# Patient Record
(demographics unavailable — no encounter records)

---

## 2017-05-14 NOTE — EDM.PDOC
ED HPI GENERAL MEDICAL PROBLEM





- General


Chief Complaint: Skin Complaint


Stated Complaint: LT LEG EDEMA


Time Seen by Provider: 17 07:00


Source of Information: Reports: Patient, Family


History Limitations: Reports: No Limitations





- History of Present Illness


INITIAL COMMENTS - FREE TEXT/NARRATIVE: 





c/o eczema of legs with oozing on the L x 2w





saw PCP 2w ago, labs done, hgb 8.9 which was down from 10.8 3y ago. Last 

colonoscopy 10/16 here was neg per pt.





INR 3.0 2w ago.





2w ago told to use hand lotion on legs. No change in meds. 





Inc oozing of L ankle x 2d, sock and shoe are wet. Chronic pain in both feet 

and ankles. 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 Uses walker x 2y, 

lives alone, worked 31 yr on concrete floor in  at [employer].





here with dtr and son-in-law, pt and dtr from [place]





does not know why she is anemic





2w ago had BUN/creat 20/1.8 (c/w 20/0.8 3y ago), TSH 5.8 and free T4 1.2 2w ago

, A1C 6.6 2w ago





h/o DVT 10y ago, on warfarin ever since





- Related Data


 Allergies











Allergy/AdvReac Type Severity Reaction Status Date / Time


 


ciprofloxacin [From Cipro] Allergy  Rash Verified 16 08:09


 


ciprofloxacin HCl Allergy  Rash Verified 16 08:09





[From Cipro]     











Home Meds: 


 Home Meds





Hydrochlorothiazide 25 mg PO DAILY 10/30/13 [History]


Levothyroxine Sodium [Synthroid] 75 mcg PO DAILY 10/30/13 [History]


Lisinopril [Prinivil] 40 mg PO DAILY 10/30/13 [History]


Multivitamin with Minerals [Multiple Vitamin] 1 tab PO DAILY 10/30/13 [History]


Nebivolol [Bystolic] 10 mg PO BID 10/30/13 [History]


Pravastatin [Pravachol] 40 mg PO BEDTIME 10/30/13 [History]


Triamcinolone Acetonide [Kenalog 0.1% Crm] 15 gm .XX BID PRN 10/30/13 [History]


Vit A/Vit C/Vit E/Zinc/Copper [Preservision Areds Softgel] 1 each PO DAILY 10/30

/13 [History]


Warfarin [Coumadin] 5 mg PO TUTHSA 10/30/13 [History]


amLODIPine [Norvasc] 10 mg PO BID 10/30/13 [History]


metFORMIN HCl [Glucophage] 500 mg PO QPM 10/30/13 [History]


Fexofenadine [Allegra] 60 mg PO DAILY PRN 10/23/14 [History]


Warfarin [Coumadin] 2.5 mg PO SUMOWEFR 10/23/14 [History]


Gabapentin [Neurontin] 100 mg PO BID PRN 16 [History]


Ketotifen Fumarate [Zaditor] 1 drop OP ASDIRECTED PRN 16 [History]


Calcium Carbonate [Calcium] 600 mg PO BID 11/10/16 [History]











Past Medical History


HEENT History: Reports: Cataract, Impaired Vision


Cardiovascular History: Reports: Blood Clots/VTE/DVT, Heart Murmur, High 

Cholesterol, Hypertension


Respiratory History: Reports: None


Gastrointestinal History: Reports: Diverticulosis


Genitourinary History: Reports: Urinary Incontinence, Other (See Below)


Other Genitourinary History: A&P REPAIR,  DYSFUNCTIONAL UTERINE BLEEDING.


OB/GYN History: Reports: Dysfunctional Uterine Bleeding, Pregnancy


Other OB/BYN History:  II PARA II


Musculoskeletal History: Reports: Osteoarthritis


Neurological History: Reports: CVA


Psychiatric History: Reports: None


Endocrine/Metabolic History: Reports: Diabetes, Type II, Hypothyroidism, Obesity

/BMI 30+


Hematologic History: Reports: Anemia


Other Hematologic History: POST SURGICAL FROM [facility]


Oncologic (Cancer) History: Reports: None


Dermatologic History: Reports: Venous Stasis Dermatitis





- Infectious Disease History


Infectious Disease History: Reports: Chicken Pox, Measles, Mumps





- Past Surgical History


Head Surgeries/Procedures: Reports: None


HEENT Surgical History: Reports: Cataract Surgery


GI Surgical History: Reports: Appendectomy, Cholecystectomy, Colon, Colonoscopy

, Hernia, Abdominal, Hernia Repair/Other


Female  Surgical History: Reports: D&C, Hysterectomy, Oophorectomy, Salpingo-

Oophorectomy





Social & Family History





- Family History


Other HEENT Family History: SEE HX





- Tobacco Use


Smoking Status *Q: Never Smoker


Second Hand Smoke Exposure: No





- Caffeine Use


Caffeine Use: Reports: Coffee





- Alcohol Use


Days Per Week of Alcohol Use: 0





- Recreational Drug Use


Recreational Drug Use: No





ED ROS GENERAL





- Review of Systems


Review Of Systems: See Below


Constitutional: Reports: No Symptoms


HEENT: Reports: No Symptoms


Respiratory: Reports: No Symptoms, Other (denies cough/sob, dtr confirms)


Cardiovascular: Reports: No Symptoms


Endocrine: Reports: No Symptoms


GI/Abdominal: Reports: No Symptoms


: Reports: No Symptoms


Musculoskeletal: Reports: No Symptoms


Skin: Reports: Other (LE edema)


Neurological: Reports: No Symptoms


Psychiatric: Reports: No Symptoms


Hematologic/Lymphatic: Reports: No Symptoms


Immunologic: Reports: No Symptoms





ED EXAM, SKIN/RASH


Exam: See Below


Exam Limited By: No Limitations


General Appearance: Alert, WD/WN, No Apparent Distress


Ears: Hearing Grossly Normal


Nose: Normal Inspection, Normal Mucosa, No Blood


Throat/Mouth: Normal Inspection, No Airway Compromise


Head: Atraumatic, Normocephalic


Neck: Normal Inspection, Supple, Non-Tender, Full Range of Motion


Respiratory/Chest: Other (slight b/l wheeze b/l, slight dyspnea, no rales, JAE 

posteriorly with slight more prominent wheeze on auscultation)


Cardiovascular: Other (2-3/6 NATHANIEL at LLSB to LUSB, no carotid bruit, no s3/s4, 

no heave, no alicia murmur)


GI/Abdominal: Soft, Non-Tender, No Distention, Other (obese, no CVAT b/l, no 

dull at flanks)


Back Exam: Normal Inspection, Full Range of Motion


Extremities: Other (woody edema with slight red from VSD up pre-tib 80% b/l, 

epidermal scale of LE to knees b/l d/t VSD, slight ooze from L lateral ankle, 

one drop of clear fluid noted, thinning of skin in 1 x 1 cm area at L ankle c/w 

with pressure point when sleeping, skin intact, trace DP pulse b/l, dec'd 

turgor UE with 0.5 sec tenting b/l)


Neurological: Alert, Oriented, CN II-XII Intact, Normal Cognition


Psychiatric: Normal Affect





Course





- Vital Signs


Last Recorded V/S: 


 Last Vital Signs











Temp  36.6 C   17 06:35


 


Pulse  75   17 06:35


 


Resp  18   17 06:35


 


BP  166/67 H  17 06:35


 


Pulse Ox  97   17 06:35








 





Orthostatic Blood Pressure [     164/67


Standing]                        


Orthostatic Blood Pressure [     150/64


Sitting]                         


Orthostatic Blood Pressure [     154/79


Supine]                          











- Orders/Labs/Meds


Orders: 


 Active Orders 24 hr











 Category Date Time Status


 


 EKG Documentation Completion [RC] ASDIRECTED Care  17 07:27 Active


 


 Orthostatic Vital Signs [RC] ASDIRECTED Care  17 07:32 Active


 


 Chest 2V [CR] Stat Exams  17 07:26 Taken


 


 UA W/MICROSCOPIC [URIN] Stat Lab  17 07:26 Uncollected


 


 EKG 12 Lead [EK] Routine Ther  17 07:27 Ordered











Labs: 


 Laboratory Tests











  17 Range/Units





  07:45 07:45 07:45 


 


WBC  4.8    (4.5-12.0)  X10-3/uL


 


RBC  3.45    (3.23-5.20)  x10(6)uL


 


Hgb  9.0 L    (11.5-15.5)  g/dL


 


Hct  28.0 L    (30.0-51.3)  %


 


MCV  81.2    (80-96)  fL


 


MCH  26.0 L    (27.7-33.6)  pg


 


MCHC  32.0 L    (32.2-35.4)  g/dL


 


RDW  18.8 H    (11.5-15.5)  %


 


Plt Count  294    (125-369)  X10(3)uL


 


MPV  7.7    (7.4-10.4)  fL


 


Add Manual Diff  Yes    


 


Neutrophils % (Manual)  68    (46-82)  %


 


Band Neutrophils %  1    (0-6)  %


 


Lymphocytes % (Manual)  18    (13-37)  %


 


Monocytes % (Manual)  13 H    (4-12)  %


 


Anisocytosis  Few    


 


PT   21.2 H   (8.7-11.1)  


 


INR   2.07 H   (0.89-1.13)  


 


Sodium    129 L  (135-145)  mmol/L


 


Potassium    4.1  (3.5-5.3)  mmol/L


 


Chloride    96 L  (100-110)  mmol/L


 


Carbon Dioxide    23  (23-29)  mmol/L


 


BUN    23  (8-23)  mg/dL


 


Creatinine    0.8  (0.6-1.3)  mg/dL


 


Est Cr Clr Drug Dosing    44.08  mL/min


 


Estimated GFR (MDRD)    > 60  (>60)  


 


BUN/Creatinine Ratio    28.8 H  (9-20)  


 


Glucose    152 H  ()  mg/dL


 


Calcium    9.3  (8.6-10.2)  mg/dL


 


Total Bilirubin    0.6  (0.1-1.3)  mg/dL


 


AST    17  D  (5-27)  IU/L


 


ALT    9 L D  (14-26)  IU/L


 


Alkaline Phosphatase    68  ()  IU/L


 


Troponin I     (0.02-0.06)  NG/ML


 


C-Reactive Protein    2.3 H  (0.0-1.0)  mg/dL


 


B-Natriuretic Peptide     (0-100)  pg/mL


 


Total Protein    8.7 H  (6.0-8.0)  g/dL


 


Albumin    4.3  (3.2-4.6)  g/dL


 


Globulin    4.4  g/dL


 


Albumin/Globulin Ratio    1.0  














  17 Range/Units





  07:45 07:45 


 


WBC    (4.5-12.0)  X10-3/uL


 


RBC    (3.23-5.20)  x10(6)uL


 


Hgb    (11.5-15.5)  g/dL


 


Hct    (30.0-51.3)  %


 


MCV    (80-96)  fL


 


MCH    (27.7-33.6)  pg


 


MCHC    (32.2-35.4)  g/dL


 


RDW    (11.5-15.5)  %


 


Plt Count    (125-369)  X10(3)uL


 


MPV    (7.4-10.4)  fL


 


Add Manual Diff    


 


Neutrophils % (Manual)    (46-82)  %


 


Band Neutrophils %    (0-6)  %


 


Lymphocytes % (Manual)    (13-37)  %


 


Monocytes % (Manual)    (4-12)  %


 


Anisocytosis    


 


PT    (8.7-11.1)  


 


INR    (0.89-1.13)  


 


Sodium    (135-145)  mmol/L


 


Potassium    (3.5-5.3)  mmol/L


 


Chloride    (100-110)  mmol/L


 


Carbon Dioxide    (23-29)  mmol/L


 


BUN    (8-23)  mg/dL


 


Creatinine    (0.6-1.3)  mg/dL


 


Est Cr Clr Drug Dosing    mL/min


 


Estimated GFR (MDRD)    (>60)  


 


BUN/Creatinine Ratio    (9-20)  


 


Glucose    ()  mg/dL


 


Calcium    (8.6-10.2)  mg/dL


 


Total Bilirubin    (0.1-1.3)  mg/dL


 


AST    (5-27)  IU/L


 


ALT    (14-26)  IU/L


 


Alkaline Phosphatase    ()  IU/L


 


Troponin I  < 0.01 L   (0.02-0.06)  NG/ML


 


C-Reactive Protein    (0.0-1.0)  mg/dL


 


B-Natriuretic Peptide   784 H  (0-100)  pg/mL


 


Total Protein    (6.0-8.0)  g/dL


 


Albumin    (3.2-4.6)  g/dL


 


Globulin    g/dL


 


Albumin/Globulin Ratio    











Meds: 


Medications














Discontinued Medications














Generic Name Dose Route Start Last Admin





  Trade Name Freq  PRN Reason Stop Dose Admin


 


Furosemide  40 mg  17 07:28  17 09:22





  Lasix  PO  17 07:29  40 mg





  ONETIME ONE   Administration














- Re-Assessments/Exams


Free Text/Narrative Re-Assessment/Exam: 





17 09:33


last echo  with EF 60-65%, mild LV diastolic dysfunction, nl LV size. 

However now with cardiomegaly on CxR and EKG with G's in V1-V3. Inc'd BUN/creat 

23/0.8.  without comparison. Inc'd  RP 2.3 c/w LE cellulitis b/l. Na 129 

down from 133. RDW 18.8. Pt reports she has lost 50 lbs several yrs ago. 

Initial PO 97% on RA, however pt was later 89% and O2 begun. Still dyspnea on 

O2 and using abd muscles to breath.


Free Text/Narrative Re-Assessment/Exam: 





17 09:43


d/w Dr You who accepted pt in admission





Departure





- Departure


Time of Disposition: 09:37


Disposition: Admitted As Inpatient 66


Condition: good


Clinical Impression: 


 Pulmonary edema, Venous stasis dermatitis of both lower extremities, 

Cellulitis of both lower extremities, Elevated brain natriuretic peptide (BNP) 

level, Elevated C-reactive protein (CRP), Microcytic hypochromic anemia, Acute 

on chronic renal failure, Hypoxia, Acute dyspnea, Anteroseptal myocardial 

infarction, Hyponatremia





Acute exacerbation of congestive heart failure


Qualifiers:


 Congestive heart failure type: combined Qualified Code(s): I50.43 - Acute on 

chronic combined systolic (congestive) and diastolic (congestive) heart failure








- Discharge Information


Forms:  ED Department Discharge





- My Orders


Last 24 Hours: 


My Active Orders





17 07:26


Chest 2V [CR] Stat 


UA W/MICROSCOPIC [URIN] Stat 





17 07:27


EKG Documentation Completion [RC] ASDIRECTED 


EKG 12 Lead [EK] Routine 





17 07:32


Orthostatic Vital Signs [RC] ASDIRECTED 














- Assessment/Plan


Last 24 Hours: 


My Active Orders





17 07:26


Chest 2V [CR] Stat 


UA W/MICROSCOPIC [URIN] Stat 





17 07:27


EKG Documentation Completion [RC] ASDIRECTED 


EKG 12 Lead [EK] Routine 





17 07:32


Orthostatic Vital Signs [RC] ASDIRECTED

## 2017-05-14 NOTE — PCM.HP
H&P History of Present Illness





- General


Date of Service: 17


Admit Problem/Dx: 


 Admission Diagnosis/Problem





Admission Diagnosis/Problem      Heart failure








Source of Information: Patient, Old Records


History Limitations: Reports: No Limitations





- History of Present Illness


Initial Comments - Free Text/Narative: 





83-year-old female brought to because a swelling of the legs and weeping ulcers

, especially on the left.  Just been seen by a FNP in a local clinic where she 

was advised to use hydrocortisone which has not helped.  The symptoms are going 

on for just about a week.  There's been no fever or chills.  In the emergency 

room, she was noted to be slightly hypoxic, and a chest x-ray revealed some 

pulmonary edema and features suggestive suggestive of congestive heart failure. 

She's been admitted for diuresis, wound management, elevation and further 

disposition.  She lives by herself.  Notably,she has a history of DVTs and uses 

Coumadin longterm.  She has hypertension that is well-controlled.  She had a 

stroke 20 years ago with mild residual symptoms on the left side.  Denies any 

chest pain fever chills or cough.


  ** Left Lower Leg


Pain Score (Numeric/FACES): 0





- Related Data


Allergies/Adverse Reactions: 


 Allergies











Allergy/AdvReac Type Severity Reaction Status Date / Time


 


ciprofloxacin [From Cipro] Allergy  Rash Verified 16 08:09


 


ciprofloxacin HCl Allergy  Rash Verified 16 08:09





[From Cipro]     











Home Medications: 


 Home Meds





Hydrochlorothiazide 25 mg PO DAILY 10/30/13 [History]


Levothyroxine Sodium [Synthroid] 75 mcg PO DAILY 10/30/13 [History]


Lisinopril [Prinivil] 40 mg PO DAILY 10/30/13 [History]


Multivitamin with Minerals [Multiple Vitamin] 1 tab PO DAILY 10/30/13 [History]


Nebivolol [Bystolic] 10 mg PO BID 10/30/13 [History]


Pravastatin [Pravachol] 40 mg PO BEDTIME 10/30/13 [History]


Triamcinolone Acetonide [Kenalog 0.1% Crm] 15 gm .XX BID PRN 10/30/13 [History]


Vit A/Vit C/Vit E/Zinc/Copper [Preservision Areds Softgel] 1 each PO DAILY 10/30

/13 [History]


Warfarin [Coumadin] 5 mg PO TUTHSA 10/30/13 [History]


amLODIPine [Norvasc] 10 mg PO BID 10/30/13 [History]


metFORMIN HCl [Glucophage] 500 mg PO QPM 10/30/13 [History]


Fexofenadine [Allegra] 60 mg PO DAILY PRN 10/23/14 [History]


Warfarin [Coumadin] 2.5 mg PO SUMOWEFR 10/23/14 [History]


Gabapentin [Neurontin] 100 mg PO BID PRN 16 [History]


Ketotifen Fumarate [Zaditor] 1 drop OP ASDIRECTED PRN 16 [History]


Calcium Carbonate [Calcium] 600 mg PO BID 11/10/16 [History]











Past Medical History


HEENT History: Reports: Cataract, Impaired Vision


Cardiovascular History: Reports: Blood Clots/VTE/DVT, Heart Failure, Heart 

Murmur, High Cholesterol, Hypertension


Respiratory History: Reports: None


Gastrointestinal History: Reports: Diverticulosis


Genitourinary History: Reports: Urinary Incontinence, Other (See Below)


Other Genitourinary History: A&P REPAIR,  DYSFUNCTIONAL UTERINE BLEEDING.


OB/GYN History: Reports: Dysfunctional Uterine Bleeding, Pregnancy


Other OB/BYN History:  II PARA II


Musculoskeletal History: Reports: Osteoarthritis


Neurological History: Reports: CVA


Psychiatric History: Reports: None


Endocrine/Metabolic History: Reports: Diabetes, Type II, Hypothyroidism, Obesity

/BMI 30+


Hematologic History: Reports: Anemia


Other Hematologic History: POST SURGICAL FROM [facility]


Immunologic History: Reports: None


Oncologic (Cancer) History: Reports: None


Dermatologic History: Reports: Venous Stasis Dermatitis





- Infectious Disease History


Infectious Disease History: Reports: Chicken Pox, Measles, Mumps





- Past Surgical History


Head Surgeries/Procedures: Reports: None


HEENT Surgical History: Reports: Cataract Surgery


GI Surgical History: Reports: Appendectomy, Cholecystectomy, Colon, Colonoscopy

, Hernia, Abdominal, Hernia Repair/Other


Female  Surgical History: Reports: D&C, Hysterectomy, Oophorectomy, Salpingo-

Oophorectomy





Social & Family History





- Family History


Other HEENT Family History: SEE HX





- Tobacco Use


Smoking Status *Q: Former Smoker


Years of Tobacco use: 5


Packs/Tins Daily: 0.2


Used Tobacco, but Quit: Yes


Month Tobacco Last Used: 1970


Second Hand Smoke Exposure: No





- Caffeine Use


Caffeine Use: Reports: Coffee, Soda





- Alcohol Use


Days Per Week of Alcohol Use: 0





- Recreational Drug Use


Recreational Drug Use: No





H&P Review of Systems





- Review of Systems:


Review Of Systems: ROS reveals no pertinent complaints other than HPI.





Exam





- Exam


Exam: See Below





- Vital Signs


Vital Signs: 


 Last Vital Signs











Temp  98.7 F   17 15:40


 


Pulse  65   17 15:40


 


Resp  18   17 15:40


 


BP  124/58 L  17 15:40


 


Pulse Ox  97   17 15:40











Weight: 93.071 kg





- Exam


General: Alert, Oriented, 4


HEENT: PERRLA, Hearing Intact, Mucosa Moist & Pink, Nares Patent, Normal Nasal 

Septum, Posterior Pharynx Clear, Conjunctiva Clear, EOMI, EACs Clear, TMs Clear


Neck: Supple, Trachea Midline, 2


Lungs: Clear to Auscultation, Normal Respiratory Effort


Cardiovascular: Regular Rate, Regular Rhythm, Systolic Murmur


Abdomen: Normal Bowel Sounds, Soft


 (Female) Exam: Normal External Exam, Normal Speculum Exam, Normal Bimanual 

Exam


Rectal (Female) Exam: Deferred


Back Exam: Normal Inspection, Full Range of Motion, NT


Extremities: Edema


Skin: Other (venous stasis dermatitis)


Neurological: Cranial Nerves Intact, Reflexes Equal Bilateral


Neuro Extensive - Mental Status: Alert, Oriented x3, Normal Mood/Affect, Normal 

Cognition


Neuro Extensive - Motor, Sensory, Reflexes: CN II-XII Intact, Normal Gait, 

Normal Reflexes


Psychiatric: Alert, Normal Affect, Normal Mood





- Patient Data


Lab Results last 24 hrs: 


 Laboratory Results - last 24 hr











  17 Range/Units





  11:20 11:35 17:26 


 


POC Glucose   144 H  112  ()  mg/dL


 


Urine Color  Yellow    (YELLOW)  


 


Urine Appearance  Clear    (CLEAR)  


 


Urine pH  6.0    (5.0-6.5)  


 


Ur Specific Gravity  1.010    (1.010-1.025)  


 


Urine Protein  Negative    (NEGATIVE)  mg/dL


 


Urine Glucose (UA)  Normal    (NEGATIVE)  mg/dL


 


Urine Ketones  Negative    (NEGATIVE)  mg/dL


 


Urine Occult Blood  Negative    (NEGATIVE)  


 


Urine Nitrite  Negative    (NEGATIVE)  


 


Urine Bilirubin  Negative    (NEGATIVE)  


 


Urine Urobilinogen  Normal    (NEGATIVE)  mg/dL


 


Ur Leukocyte Esterase  Negative    (NEGATIVE)  


 


Urine RBC  Not seen    (0)  


 


Urine WBC  0-5    (0)  


 


Ur Squamous Epith Cells  Few H    (NS,R,O)  


 


Urine Bacteria  Not seen    (NS)  











Result Diagrams: 


 17 07:45





 17 07:45





EKG INTERPRETATION


Rhythm: NSR





*Q Meaningful Use (ADM)





- VTE *Q


VTE Criteria *Q: 








- Stroke *Q


Stroke Criteria *Q: 








- AMI *Q


AMI Criteria *Q: 








- Problem List


(1) CHF (congestive heart failure)


SNOMED Code(s): 61550310


   ICD Code: I50.9 - HEART FAILURE, UNSPECIFIED   Status: Acute   Current Visit

: Yes   


Qualifiers: 


   Congestive heart failure type: diastolic   Congestive heart failure 

chronicity: acute on chronic   Qualified Code(s): I50.33 - Acute on chronic 

diastolic (congestive) heart failure   





(2) Microcytic hypochromic anemia


SNOMED Code(s): 63208058


   ICD Code: D50.9 - IRON DEFICIENCY ANEMIA, UNSPECIFIED   Status: Acute   

Current Visit: Yes   





(3) Venous stasis dermatitis of both lower extremities


SNOMED Code(s): 92556390


   ICD Code: I87.2 - VENOUS INSUFFICIENCY (CHRONIC) (PERIPHERAL)   Status: 

Acute   Current Visit: Yes   





(4) H/O deep venous thrombosis


SNOMED Code(s): 683881593


   ICD Code: Z86.718 - PERSONAL HISTORY OF OTHER VENOUS THROMBOSIS AND EMBOLISM

   Status: Chronic   Current Visit: Yes   





(5) Long term (current) use of anticoagulants


SNOMED Code(s): 709548645


   ICD Code: Z79.01 - LONG TERM (CURRENT) USE OF ANTICOAGULANTS   Status: Acute

   Current Visit: Yes   





(6) Diabetes type 2, controlled


SNOMED Code(s): 12838313


   ICD Code: E11.9 - TYPE 2 DIABETES MELLITUS WITHOUT COMPLICATIONS   Status: 

Acute   Current Visit: Yes   


Qualifiers: 


   Diabetes mellitus complication status: without complication   Diabetes 

mellitus long term insulin use: with long term use   Qualified Code(s): E11.9 - 

Type 2 diabetes mellitus without complications; Z79.4 - Long term (current) use 

of insulin   





(7) HTN (hypertension)


SNOMED Code(s): 03919799


   ICD Code: I10 - ESSENTIAL (PRIMARY) HYPERTENSION   Status: Acute   Current 

Visit: Yes   


Qualifiers: 


   Hypertension type: essential hypertension   Qualified Code(s): I10 - 

Essential (primary) hypertension   


Problem List Initiated/Reviewed/Updated: Yes


Orders Last 24hrs: 


 Active Orders 24 hr











 Category Date Time Status


 


 Patient Status [ADT] Routine ADT  17 09:44 Active


 


 Cardiac Monitoring [RC] 08,16,00 Care  17 09:57 Active


 


 Communication Order [RC] ROUTINE Care  17 09:55 Active


 


 Daily Weight [Height and Weight] [RC] 06 Care  17 09:49 Active


 


 EKG Documentation Completion [RC] DAILY Care  17 09:53 Active


 


 Glucose [Blood Glucose Check, Bedside] [RC] 07,1130, Care  17 09:50 

Active





 1730,21   


 


 Intake and Output [RC] 06,14,22 Care  17 09:50 Active


 


 Oxygen Therapy Adult [Oxygen Therapy] [RC] .PRN Care  17 09:49 Active


 


 VTE/DVT Education [RC] Per Unit Routine Care  17 09:44 Active


 


 Vital Signs [RC] 04,08,12,16,20,00 Care  17 09:44 Active


 


 OT Evaluation and Treatment [CONS] Routine Cons  17 18:43 Active


 


 PT Evaluation and Treatment [CONS] Routine Cons  17 18:43 Active


 


 Consistent Carbohydrate Diet [DIET] Diet  17 Dinner Active


 


 Echo Comp wo Cont [US] Routine Exams  05/15/17 09:52 Ordered


 


 B-TYPE NATRIURETIC PEPTIDE,BNP [CHEM] AM Lab  05/15/17 05:11 Ordered


 


 BASIC METABOLIC PANEL,BMP [CHEM] DAILY Lab  05/15/17 06:00 Ordered


 


 BASIC METABOLIC PANEL,BMP [CHEM] DAILY Lab  17 06:00 Ordered


 


 BASIC METABOLIC PANEL,BMP [CHEM] DAILY Lab  17 06:00 Ordered


 


 CBC WITH AUTO DIFF [HEME] DAILY Lab  05/15/17 06:00 Ordered


 


 CBC WITH AUTO DIFF [HEME] DAILY Lab  17 06:00 Ordered


 


 CBC WITH AUTO DIFF [HEME] DAILY Lab  17 06:00 Ordered


 


 TROPONIN I [CHEM] Routine Lab  05/15/17 06:00 Ordered


 


 Furosemide [Lasix] Med  17 14:00 Active





 40 mg PO BIDDIURETIC   


 


 Sodium Chloride 0.9% [Normal Saline] 250 ml Med  17 11:00 Active





 IV ASDIRECTED   


 


 Sodium Chloride 0.9% [Saline Flush] Med  17 13:31 Active





 10 ml FLUSH ASDIRECTED PRN   


 


 cefTRIAXone [Rocephin] 1 gm Med  17 10:00 Active





 Sodium Chloride 0.9% [Normal Saline] 100 ml   





 IV Q24H   


 


 Resuscitation Status Routine Resus Stat  17 09:44 Ordered


 


 EKG 12 Lead [EK] DAILY Ther  17 09:52 Ordered








 Medication Orders





Furosemide (Lasix)  40 mg PO BIDDIURETIC SILVIO


   Last Admin: 17 14:20  Dose: 40 mg


Ceftriaxone Sodium 1 gm/ (Sodium Chloride)  100 mls @ 200 mls/hr IV Q24H SILVIO


   Last Admin: 17 10:30  Dose: 200 mls/hr


Sodium Chloride (Normal Saline)  250 mls @ 100 mls/hr IV ASDIRECTED [facility]


   Last Admin: 17 10:30  Dose: 100 mls/hr


Sodium Chloride (Saline Flush)  10 ml FLUSH ASDIRECTED PRN


   PRN Reason: flush med








Assessment/Plan Comment:: 





The wounds were then wrapped, which would promote edema to improve.  We'll 

resume her home medications, but I do agree with Lasix which has significantly 

helped since she was admitted.  I will plan to repeat basic metabolic profile a 

BNP in the morning and PT and OT to see her for further disposition.  I 

anticipate a brief hospital stay

## 2017-05-15 NOTE — CR
INDICATION:  Wheezing, question pulmonary edema.  



CHEST:  AP and lateral views of the chest were obtained in a wheelchair 05/14/
2017.  No comparisons were available.  



Upper lung field pulmonary vasculature is prominent.  Interstitial markings are 
prominent.  Findings suggest CHF with interstitial lung edema.  No definite 
acute pulmonary edema was seen, however. 



The aorta is tortuous with calcification in the arch. 



Diminished bone density is noted, compatible with osteoporosis.  



Prominent AP diameter and flattening of diaphragm leaves with hyperaeration are 
compatible with COPD.



Right hemidiaphragm is somewhat elevated, but this most likely is anatomic.  



There is some pleural thickening on the right compatible with minimal pleural 
fluid.  Blunting of the left posterior sulcus suggests minimal fluid there.  



A mild dextroconcave scoliosis at the lower thoracic spine is noted also.



IMPRESSION:  

1.  CHF with interstitial lung edema.  No definite evidence of pneumonia.

2.  COPD. 

3.  ASHD with cardiomegaly. 

4.  Osteoporosis, scoliosis. 

5.  Somewhat heavy markings at the lower lung fields make it difficult to 
exclude minimal patchy bronchopneumonia additionally, although this appearance 
could possibly be on the basis of minimal patchy alveolar lung edema - acute 
pulmonary edema.  The typical batwing appearance of acute pulmonary edema is 
not visualized in this patient at this time.    
MTDD

## 2017-05-15 NOTE — PN
DATE SEEN:  05/15/2017

 

REASON FOR VISIT:  CHF.

 

HISTORY OF PRESENT ILLNESS:  An 83-year-old female, who was brought in because

of a mild CHF exacerbation, lymphedema, and leg stasis dermatitis.  She has lost

5 pounds in the last 24 hours with diuresis.  Feels good this morning.  Denies

fever or chills.  Still has pain in both legs and a rash.

 

PAST MEDICAL HISTORY:  Anemia, hypertension, type 2 diabetes, CHF, history of

DVTs.

 

REVIEW OF SYSTEMS:  No chest pain today.  No fever or chills.  No shortness of

breath.

 

PHYSICAL EXAMINATION:  VITAL SIGNS:  Blood pressure is 157/69, pulse is 68, and

temp 98.6.  ENT:  Negative.  CHEST:  Clear.  CARDIOVASCULAR:  Heart murmur 2/6.

EXTREMITIES:  No peripheral edema.  There is venous stasis dermatitis.  They are

tender to palpation.  MENTAL STATUS:  Alert.

 

LABORATORY DATA:  Hemoglobin today is 8.8.  Sodium 134, creatinine is normal.

Troponin is less than 0.01 and BNP is 612.

 

IMPRESSION:

1. Mild to moderate congestive heart failure exacerbation.

2. Venous stasis dermatitis of the lower extremities.

3. Hypertension.

4. Type 2 diabetes.

5. Anemia that is chronic.

 

PLAN:  My plan is to discharge her home on Lasix 20 mg once a day, and I would

like her to see Dr. Kim before the week is over.  I do not feel that she has

an infection, given no white cell count, fever, and no overt purulent drainage.

 

Job#: [id number]

DD: 05/15/2017 0930

DT: 05/15/2017 1154 TN/MAURICE

## 2017-05-16 NOTE — DISCH
DISCHARGE DATE:  05/15/2017

 

REASON FOR ADMISSION:  Congestive heart failure exacerbation, hypertension, type

2 diabetes, venous stasis dermatitis.

 

DISCHARGE DIAGNOSES:  Congestive heart failure exacerbation, hypertension, type

2 diabetes, venous stasis dermatitis.

 

BRIEF HISTORY AND HOSPITAL COURSE:  An 83-year-old female admitted because of

leg swelling, weeping, rash, and edema.  She also had a chest x-ray that showed

mild pulmonary edema.  BNP was 754.  She was started on Lasix 40 mg b.i.d.  She

diuresed significantly and lost 5 pounds in 24 hours.  She feels better.  The

swelling in the legs decreased remarkably.  She was discharged today on the 15th

on Lasix 40 mg once a day.  She is advised to see Dr. Kim before the end of

the week, elevate the legs, return to the ED with any worsening of symptoms.

 

Please note that I spent more than 35 minutes in the discharge of this patient.

 

Job#: [id number]

DD: 05/15/2017 0957

DT: 05/16/2017 0353 TN/MAURICE